# Patient Record
Sex: FEMALE | Race: WHITE | Employment: FULL TIME | URBAN - METROPOLITAN AREA
[De-identification: names, ages, dates, MRNs, and addresses within clinical notes are randomized per-mention and may not be internally consistent; named-entity substitution may affect disease eponyms.]

---

## 2018-05-18 ENCOUNTER — HOSPITAL ENCOUNTER (OUTPATIENT)
Dept: MAMMOGRAPHY | Facility: CLINIC | Age: 53
Discharge: HOME | End: 2018-05-18
Attending: OBSTETRICS & GYNECOLOGY | Admitting: OBSTETRICS & GYNECOLOGY
Payer: COMMERCIAL

## 2018-05-18 DIAGNOSIS — Z12.31 VISIT FOR SCREENING MAMMOGRAM: ICD-10-CM

## 2018-05-18 PROCEDURE — 77067 SCR MAMMO BI INCL CAD: CPT

## 2020-11-07 DIAGNOSIS — Z11.59 ENCOUNTER FOR SCREENING FOR OTHER VIRAL DISEASES: Primary | ICD-10-CM

## 2020-12-01 DIAGNOSIS — Z11.59 ENCOUNTER FOR SCREENING FOR OTHER VIRAL DISEASES: ICD-10-CM

## 2020-12-01 PROCEDURE — U0003 INFECTIOUS AGENT DETECTION BY NUCLEIC ACID (DNA OR RNA); SEVERE ACUTE RESPIRATORY SYNDROME CORONAVIRUS 2 (SARS-COV-2) (CORONAVIRUS DISEASE [COVID-19]), AMPLIFIED PROBE TECHNIQUE, MAKING USE OF HIGH THROUGHPUT TECHNOLOGIES AS DESCRIBED BY CMS-2020-01-R: HCPCS | Performed by: COLON & RECTAL SURGERY

## 2020-12-02 LAB
SARS-COV-2 RNA SPEC QL NAA+PROBE: NOT DETECTED
SPECIMEN SOURCE: NORMAL

## 2020-12-04 ENCOUNTER — HOSPITAL ENCOUNTER (OUTPATIENT)
Facility: CLINIC | Age: 55
Discharge: HOME | End: 2020-12-04
Attending: COLON & RECTAL SURGERY | Admitting: COLON & RECTAL SURGERY
Payer: COMMERCIAL

## 2020-12-04 VITALS
DIASTOLIC BLOOD PRESSURE: 85 MMHG | WEIGHT: 235 LBS | SYSTOLIC BLOOD PRESSURE: 140 MMHG | TEMPERATURE: 98.2 F | BODY MASS INDEX: 36.88 KG/M2 | HEART RATE: 91 BPM | RESPIRATION RATE: 45 BRPM | HEIGHT: 67 IN | OXYGEN SATURATION: 96 %

## 2020-12-04 LAB — COLONOSCOPY: NORMAL

## 2020-12-04 PROCEDURE — 45380 COLONOSCOPY AND BIOPSY: CPT | Performed by: COLON & RECTAL SURGERY

## 2020-12-04 PROCEDURE — 45385 COLONOSCOPY W/LESION REMOVAL: CPT | Performed by: COLON & RECTAL SURGERY

## 2020-12-04 PROCEDURE — 88305 TISSUE EXAM BY PATHOLOGIST: CPT | Mod: 26 | Performed by: PATHOLOGY

## 2020-12-04 PROCEDURE — 88305 TISSUE EXAM BY PATHOLOGIST: CPT | Mod: TC,91 | Performed by: COLON & RECTAL SURGERY

## 2020-12-04 PROCEDURE — G0500 MOD SEDAT ENDO SERVICE >5YRS: HCPCS | Performed by: COLON & RECTAL SURGERY

## 2020-12-04 PROCEDURE — 272N000105 HC DEVICE CLIP QUICK: Performed by: COLON & RECTAL SURGERY

## 2020-12-04 PROCEDURE — 250N000011 HC RX IP 250 OP 636: Performed by: COLON & RECTAL SURGERY

## 2020-12-04 PROCEDURE — 99153 MOD SED SAME PHYS/QHP EA: CPT | Performed by: COLON & RECTAL SURGERY

## 2020-12-04 RX ORDER — ATORVASTATIN CALCIUM 20 MG/1
40 TABLET, FILM COATED ORAL DAILY
COMMUNITY
Start: 2020-09-03

## 2020-12-04 RX ORDER — ONDANSETRON 2 MG/ML
4 INJECTION INTRAMUSCULAR; INTRAVENOUS EVERY 6 HOURS PRN
Status: DISCONTINUED | OUTPATIENT
Start: 2020-12-04 | End: 2020-12-04 | Stop reason: HOSPADM

## 2020-12-04 RX ORDER — ONDANSETRON 4 MG/1
4 TABLET, ORALLY DISINTEGRATING ORAL EVERY 6 HOURS PRN
Status: DISCONTINUED | OUTPATIENT
Start: 2020-12-04 | End: 2020-12-04 | Stop reason: HOSPADM

## 2020-12-04 RX ORDER — NALOXONE HYDROCHLORIDE 0.4 MG/ML
0.4 INJECTION, SOLUTION INTRAMUSCULAR; INTRAVENOUS; SUBCUTANEOUS
Status: DISCONTINUED | OUTPATIENT
Start: 2020-12-04 | End: 2020-12-04 | Stop reason: HOSPADM

## 2020-12-04 RX ORDER — LEVOTHYROXINE SODIUM 125 UG/1
125 TABLET ORAL DAILY
COMMUNITY
Start: 2020-11-29

## 2020-12-04 RX ORDER — NALOXONE HYDROCHLORIDE 0.4 MG/ML
0.2 INJECTION, SOLUTION INTRAMUSCULAR; INTRAVENOUS; SUBCUTANEOUS
Status: DISCONTINUED | OUTPATIENT
Start: 2020-12-04 | End: 2020-12-04 | Stop reason: HOSPADM

## 2020-12-04 RX ORDER — FENTANYL CITRATE 50 UG/ML
INJECTION, SOLUTION INTRAMUSCULAR; INTRAVENOUS PRN
Status: DISCONTINUED | OUTPATIENT
Start: 2020-12-04 | End: 2020-12-04 | Stop reason: HOSPADM

## 2020-12-04 RX ORDER — TRAZODONE HYDROCHLORIDE 50 MG/1
100 TABLET, FILM COATED ORAL
COMMUNITY
Start: 2020-05-19

## 2020-12-04 RX ORDER — FLUMAZENIL 0.1 MG/ML
0.2 INJECTION, SOLUTION INTRAVENOUS
Status: DISCONTINUED | OUTPATIENT
Start: 2020-12-04 | End: 2020-12-04 | Stop reason: HOSPADM

## 2020-12-04 RX ORDER — LIDOCAINE 40 MG/G
CREAM TOPICAL
Status: DISCONTINUED | OUTPATIENT
Start: 2020-12-04 | End: 2020-12-04 | Stop reason: HOSPADM

## 2020-12-04 RX ORDER — METFORMIN HCL 500 MG
1000 TABLET, EXTENDED RELEASE 24 HR ORAL DAILY
COMMUNITY
Start: 2020-09-03

## 2020-12-04 RX ORDER — DIPHENHYDRAMINE HYDROCHLORIDE 50 MG/ML
25 INJECTION INTRAMUSCULAR; INTRAVENOUS EVERY 4 HOURS PRN
Status: DISCONTINUED | OUTPATIENT
Start: 2020-12-04 | End: 2020-12-04 | Stop reason: HOSPADM

## 2020-12-04 RX ORDER — PROCHLORPERAZINE MALEATE 10 MG
10 TABLET ORAL EVERY 6 HOURS PRN
Status: DISCONTINUED | OUTPATIENT
Start: 2020-12-04 | End: 2020-12-04 | Stop reason: HOSPADM

## 2020-12-04 RX ORDER — DIPHENHYDRAMINE HCL 25 MG
25 CAPSULE ORAL EVERY 4 HOURS PRN
Status: DISCONTINUED | OUTPATIENT
Start: 2020-12-04 | End: 2020-12-04 | Stop reason: HOSPADM

## 2020-12-04 RX ORDER — LISINOPRIL 5 MG/1
5 TABLET ORAL DAILY
COMMUNITY
Start: 2020-09-03 | End: 2021-12-16

## 2020-12-04 RX ORDER — ONDANSETRON 2 MG/ML
4 INJECTION INTRAMUSCULAR; INTRAVENOUS
Status: DISCONTINUED | OUTPATIENT
Start: 2020-12-04 | End: 2020-12-04 | Stop reason: HOSPADM

## 2020-12-04 ASSESSMENT — MIFFLIN-ST. JEOR: SCORE: 1683.58

## 2020-12-04 NOTE — H&P
Pre-Endoscopy History and Physical     Irma Posey MRN# [id number]   YOB: 1965 Age: 55 year old     Date of Procedure: 12/4/2020  Primary care provider: Jefe Álvarez  Type of Endoscopy: Colonoscopy  Reason for Procedure: Diarrhea  Type of Anesthesia Anticipated: Moderate Sedation    HPI:    Irma is a 55 year old female who will be undergoing the above procedure.      A history and physical has been performed. The patient's medications and allergies have been reviewed. The risks and benefits of the procedure and the sedation options and risks were discussed with the patient.  All questions were answered and informed consent was obtained.      She denies a personal or family history of anesthesia complications or bleeding disorders.     No Known Allergies     No current facility-administered medications on file prior to encounter.        atorvastatin (LIPITOR) 20 MG tablet, Take 20 mg by mouth At Bedtime       levothyroxine (SYNTHROID/LEVOTHROID) 125 MCG tablet,        lisinopril (ZESTRIL) 5 MG tablet, Take 5 mg by mouth daily       metFORMIN (GLUCOPHAGE-XR) 500 MG 24 hr tablet, TAKE 1 TABLET BY MOUTH AT BEDTIME X1WEEK, MAY INCREASE BY 1 TAB PER WEEK TO MAX OF 4       traZODone (DESYREL) 50 MG tablet, TAKE 1 OR 2 TABLETS BY MOUTH EVERY DAY AT BEDTIME AS NEEDED        There is no problem list on file for this patient.       No past medical history on file.     No past surgical history on file.    Social History     Tobacco Use     Smoking status: Not on file   Substance Use Topics     Alcohol use: Not on file       No family history on file.    REVIEW OF SYSTEMS:     5 point ROS negative except as noted above in HPI, including Gen., Resp., CV, GI &  system review.      PHYSICAL EXAM:   There were no vitals taken for this visit. There is no height or weight on file to calculate BMI.   GENERAL APPEARANCE: healthy and alert  MENTAL STATUS: alert  AIRWAY EXAM: Mallampatti Class I  (visualization of the soft palate, fauces, uvula, anterior and posterior pillars)  RESP: lungs clear to auscultation - no rales, rhonchi or wheezes  CV: regular rates and rhythm      IMPRESSION   ASA Class 2 - Mild systemic disease        PLAN:     Plan for colonoscopy. We discussed the risks, benefits and alternatives and the patient wished to proceed.    The above has been forwarded to the consulting provider.      Binta Juarez MD  [facility]  Phone: [phone]  Fax: [phone]  December 4, 2020

## 2020-12-07 LAB — COPATH REPORT: NORMAL

## 2021-11-30 ENCOUNTER — TRANSFERRED RECORDS (OUTPATIENT)
Dept: HEALTH INFORMATION MANAGEMENT | Facility: CLINIC | Age: 56
End: 2021-11-30
Payer: COMMERCIAL

## 2021-11-30 LAB — HBA1C MFR BLD: 8.5 % (ref 4.8–5.6)

## 2021-12-14 ENCOUNTER — OFFICE VISIT (OUTPATIENT)
Dept: PHARMACY | Facility: PHYSICIAN GROUP | Age: 56
End: 2021-12-14
Payer: COMMERCIAL

## 2021-12-14 DIAGNOSIS — E11.9 TYPE 2 DIABETES MELLITUS WITHOUT COMPLICATION, WITHOUT LONG-TERM CURRENT USE OF INSULIN (H): Primary | ICD-10-CM

## 2021-12-14 PROCEDURE — 99207 PR NO CHARGE LOS: CPT | Performed by: PHARMACIST

## 2021-12-14 ASSESSMENT — MIFFLIN-ST. JEOR: SCORE: 1644.02

## 2021-12-14 NOTE — PROGRESS NOTES
"Clinical Pharmacy Consult:                                                    Irma Posey is a 56 year old female coming in for a clinical pharmacist consult.  She was referred to me from Birgit Bocanegra PA-C.     Reason for Consult: Start GLP-1 agonist. Her insurance doesn't cover MTM visits, so she opted for MTM consult then will refer back to PCP for ongoing management.    Subjective/Objective: Currently taking metformin ER 1000mg at bedtime. Patient is not experiencing side effects. Unable to tolerate higher doses of metformin due to diarrhea. Also taking Lactaid with dairy helps.  Blood sugar monitoring: never.  Symptoms of low blood sugar? none  Symptoms of high blood sugar? thristy  Diet/Exercise: Patient is vegetarian and works as a . PCP referred to Bhargavi for nutrition counseling. Recently started using a food delivery service.  Aspirin: Not taking.  Statin: Yes: atorvastatin  ACEi/ARB: Yes: lisinopril.  Last A1c 8.5% on 11/30/21. Prior A1c 7.9% on 6/22/21.  Last microalbumin <30 mg/g on 4/21/21.  Last CMP on 11/30/21: sCr 1.01 mg/dL and eGFR 62 mL/min.  Last fasting lipid panel on 11/30/21: , trigs 682, HDL 45, .  Prior fasting lipid panel on 4/21/21: , trigs 270, HDL 45, .    Today's Vitals:/82   Pulse 89   Ht 5' 7\" (1.702 m)   Wt 227 lb 6.1 oz (103.1 kg)   SpO2 96%   BMI 35.61 kg/m      Assessment: Patient is not meeting A1c goal of < 7%. Patient would benefit from self-monitoring blood sugars. Provided glucometer teaching in clinic today. Patient would benefit from starting a GLP-1 agonist for A1c lowering and to promote weight loss. Provided demonstration of Ozempic pen in clinic today. Counseled on administration, dosing, expected benefits, potential side effects, and monitoring. She will monitor for signs of pancreatitis given her trigs are elevated >500. I do expect trigs will improve as blood sugars improve.     Plan:  1. Start Ozempic 0.25mg " weekly for 4 doses, then increase to 0.5mg weekly thereafter. Included savings card processing info on Rx. Please call me if having cost issues or side effects.    2. Start checking blood sugars daily fasting in the morning and occasionally in the evening after meal. Gave her a OneTouch Verio Flex meter in clinic today. Send orders for test strips and lancets to pharmacy.    3. Follow-up with PCP in 3 weeks and MTM as needed.    I spent 45 minutes with this patient today. All changes were made via collaborative practice agreement with Birgit Bocanegra PA-C. A copy of the visit note was provided to the patient's primary care provider.    Nevin Coombs, PharmD, BCACP  Medication Therapy Management Pharmacist  Pager: [phone]

## 2021-12-16 RX ORDER — BLOOD SUGAR DIAGNOSTIC
STRIP MISCELLANEOUS
COMMUNITY

## 2021-12-16 RX ORDER — LISINOPRIL AND HYDROCHLOROTHIAZIDE 20; 25 MG/1; MG/1
1 TABLET ORAL DAILY
COMMUNITY

## 2021-12-16 RX ORDER — SEMAGLUTIDE 1.34 MG/ML
0.25 INJECTION, SOLUTION SUBCUTANEOUS
COMMUNITY

## 2022-02-16 ENCOUNTER — TRANSFERRED RECORDS (OUTPATIENT)
Dept: HEALTH INFORMATION MANAGEMENT | Facility: CLINIC | Age: 57
End: 2022-02-16
Payer: COMMERCIAL

## 2022-02-16 LAB — HBA1C MFR BLD: 7.3 % (ref 4.8–5.6)

## 2022-11-22 VITALS
BODY MASS INDEX: 35.69 KG/M2 | OXYGEN SATURATION: 96 % | SYSTOLIC BLOOD PRESSURE: 120 MMHG | HEART RATE: 89 BPM | WEIGHT: 227.38 LBS | DIASTOLIC BLOOD PRESSURE: 84 MMHG | HEIGHT: 67 IN

## (undated) RX ORDER — FENTANYL CITRATE 50 UG/ML
INJECTION, SOLUTION INTRAMUSCULAR; INTRAVENOUS
Status: DISPENSED
Start: 2020-12-04